# Patient Record
Sex: MALE | Race: WHITE | Employment: OTHER | URBAN - METROPOLITAN AREA
[De-identification: names, ages, dates, MRNs, and addresses within clinical notes are randomized per-mention and may not be internally consistent; named-entity substitution may affect disease eponyms.]

---

## 2017-04-18 PROBLEM — Q21.1 PFO (PATENT FORAMEN OVALE): Status: ACTIVE | Noted: 2017-04-18

## 2017-04-18 PROBLEM — Z83.3 FAMILY HISTORY OF DIABETES MELLITUS: Status: ACTIVE | Noted: 2017-04-18

## 2017-04-18 PROBLEM — Z86.73 H/O: STROKE: Status: ACTIVE | Noted: 2017-04-18

## 2017-04-18 PROBLEM — N40.1 BPH ASSOCIATED WITH NOCTURIA: Status: ACTIVE | Noted: 2017-04-18

## 2017-04-18 PROBLEM — I34.1 MITRAL VALVE PROLAPSE: Status: ACTIVE | Noted: 2017-04-18

## 2017-04-18 PROBLEM — J45.20 MILD INTERMITTENT ASTHMA WITHOUT COMPLICATION: Status: ACTIVE | Noted: 2017-04-18

## 2017-04-18 PROBLEM — M17.0 PRIMARY OSTEOARTHRITIS OF BOTH KNEES: Status: ACTIVE | Noted: 2017-04-18

## 2017-04-18 PROBLEM — R35.1 BPH ASSOCIATED WITH NOCTURIA: Status: ACTIVE | Noted: 2017-04-18

## 2017-04-18 PROBLEM — I10 ESSENTIAL HYPERTENSION: Status: ACTIVE | Noted: 2017-04-18

## 2018-09-14 ENCOUNTER — HOSPITAL ENCOUNTER (INPATIENT)
Facility: HOSPITAL | Age: 58
LOS: 4 days | Discharge: HOME | DRG: 392 | End: 2018-09-18
Attending: INTERNAL MEDICINE | Admitting: INTERNAL MEDICINE
Payer: COMMERCIAL

## 2018-09-14 DIAGNOSIS — K57.92 DIVERTICULITIS: Primary | ICD-10-CM

## 2018-09-14 RX ORDER — POLYETHYLENE GLYCOL 3350 17 G/17G
17 POWDER, FOR SOLUTION ORAL DAILY PRN
Status: DISCONTINUED | OUTPATIENT
Start: 2018-09-14 | End: 2018-09-18

## 2018-09-14 RX ORDER — ALBUTEROL SULFATE 90 UG/1
1-2 AEROSOL, METERED RESPIRATORY (INHALATION) EVERY 6 HOURS PRN
Status: DISCONTINUED | OUTPATIENT
Start: 2018-09-14 | End: 2018-09-18

## 2018-09-14 RX ORDER — BISACODYL 10 MG
10 SUPPOSITORY, RECTAL RECTAL
Status: DISCONTINUED | OUTPATIENT
Start: 2018-09-14 | End: 2018-09-14

## 2018-09-14 RX ORDER — MORPHINE SULFATE 4 MG/ML
1 INJECTION, SOLUTION INTRAMUSCULAR; INTRAVENOUS EVERY 2 HOUR PRN
Status: DISCONTINUED | OUTPATIENT
Start: 2018-09-14 | End: 2018-09-18

## 2018-09-14 RX ORDER — DOCUSATE SODIUM 100 MG/1
100 CAPSULE, LIQUID FILLED ORAL 2 TIMES DAILY
Status: DISCONTINUED | OUTPATIENT
Start: 2018-09-14 | End: 2018-09-14

## 2018-09-14 RX ORDER — ONDANSETRON 2 MG/ML
4 INJECTION INTRAMUSCULAR; INTRAVENOUS EVERY 6 HOURS PRN
Status: DISCONTINUED | OUTPATIENT
Start: 2018-09-14 | End: 2018-09-18

## 2018-09-14 RX ORDER — ATORVASTATIN CALCIUM 10 MG/1
10 TABLET, FILM COATED ORAL NIGHTLY
Status: DISCONTINUED | OUTPATIENT
Start: 2018-09-14 | End: 2018-09-18

## 2018-09-14 RX ORDER — MORPHINE SULFATE 4 MG/ML
4 INJECTION, SOLUTION INTRAMUSCULAR; INTRAVENOUS EVERY 2 HOUR PRN
Status: DISCONTINUED | OUTPATIENT
Start: 2018-09-14 | End: 2018-09-18

## 2018-09-14 RX ORDER — ACETAMINOPHEN 325 MG/1
650 TABLET ORAL EVERY 6 HOURS PRN
Status: DISCONTINUED | OUTPATIENT
Start: 2018-09-14 | End: 2018-09-18

## 2018-09-14 RX ORDER — MORPHINE SULFATE 4 MG/ML
2 INJECTION, SOLUTION INTRAMUSCULAR; INTRAVENOUS EVERY 2 HOUR PRN
Status: DISCONTINUED | OUTPATIENT
Start: 2018-09-14 | End: 2018-09-18

## 2018-09-14 RX ORDER — SODIUM CHLORIDE 9 MG/ML
INJECTION, SOLUTION INTRAVENOUS CONTINUOUS
Status: DISCONTINUED | OUTPATIENT
Start: 2018-09-14 | End: 2018-09-15

## 2018-09-14 RX ORDER — SODIUM PHOSPHATE, DIBASIC AND SODIUM PHOSPHATE, MONOBASIC 7; 19 G/133ML; G/133ML
1 ENEMA RECTAL ONCE AS NEEDED
Status: DISCONTINUED | OUTPATIENT
Start: 2018-09-14 | End: 2018-09-14

## 2018-09-14 RX ORDER — LOSARTAN POTASSIUM 50 MG/1
50 TABLET ORAL DAILY
Status: DISCONTINUED | OUTPATIENT
Start: 2018-09-14 | End: 2018-09-18

## 2018-09-14 RX ORDER — METOCLOPRAMIDE HYDROCHLORIDE 5 MG/ML
10 INJECTION INTRAMUSCULAR; INTRAVENOUS EVERY 8 HOURS PRN
Status: DISCONTINUED | OUTPATIENT
Start: 2018-09-14 | End: 2018-09-18

## 2018-09-15 ENCOUNTER — APPOINTMENT (OUTPATIENT)
Dept: CT IMAGING | Facility: HOSPITAL | Age: 58
DRG: 392 | End: 2018-09-15
Attending: INTERNAL MEDICINE
Payer: COMMERCIAL

## 2018-09-15 LAB
ANION GAP SERPL CALC-SCNC: 10 MMOL/L (ref 0–18)
BASOPHILS # BLD AUTO: 0.04 X10(3) UL (ref 0–0.1)
BASOPHILS NFR BLD AUTO: 0.4 %
BUN BLD-MCNC: 14 MG/DL (ref 8–20)
BUN/CREAT SERPL: 12.7 (ref 10–20)
CALCIUM BLD-MCNC: 8.7 MG/DL (ref 8.3–10.3)
CHLORIDE SERPL-SCNC: 104 MMOL/L (ref 101–111)
CO2 SERPL-SCNC: 24 MMOL/L (ref 22–32)
CREAT BLD-MCNC: 1.1 MG/DL (ref 0.7–1.3)
EOSINOPHIL # BLD AUTO: 0.19 X10(3) UL (ref 0–0.3)
EOSINOPHIL NFR BLD AUTO: 2.1 %
ERYTHROCYTE [DISTWIDTH] IN BLOOD BY AUTOMATED COUNT: 12.3 % (ref 11.5–16)
GLUCOSE BLD-MCNC: 112 MG/DL (ref 65–99)
GLUCOSE BLD-MCNC: 69 MG/DL (ref 70–99)
GLUCOSE BLD-MCNC: 76 MG/DL (ref 65–99)
GLUCOSE BLD-MCNC: 83 MG/DL (ref 65–99)
GLUCOSE BLD-MCNC: 89 MG/DL (ref 65–99)
GLUCOSE BLD-MCNC: 90 MG/DL (ref 65–99)
GLUCOSE BLD-MCNC: 99 MG/DL (ref 65–99)
HCT VFR BLD AUTO: 34.2 % (ref 37–53)
HGB BLD-MCNC: 11.4 G/DL (ref 13–17)
IMMATURE GRANULOCYTE COUNT: 0.03 X10(3) UL (ref 0–1)
IMMATURE GRANULOCYTE RATIO %: 0.3 %
LYMPHOCYTES # BLD AUTO: 1.42 X10(3) UL (ref 0.9–4)
LYMPHOCYTES NFR BLD AUTO: 15.4 %
MCH RBC QN AUTO: 31.2 PG (ref 27–33.2)
MCHC RBC AUTO-ENTMCNC: 33.3 G/DL (ref 31–37)
MCV RBC AUTO: 93.7 FL (ref 80–99)
MONOCYTES # BLD AUTO: 0.78 X10(3) UL (ref 0.1–1)
MONOCYTES NFR BLD AUTO: 8.4 %
NEUTROPHIL ABS PRELIM: 6.78 X10 (3) UL (ref 1.3–6.7)
NEUTROPHILS # BLD AUTO: 6.78 X10(3) UL (ref 1.3–6.7)
NEUTROPHILS NFR BLD AUTO: 73.4 %
OSMOLALITY SERPL CALC.SUM OF ELEC: 285 MOSM/KG (ref 275–295)
PLATELET # BLD AUTO: 261 10(3)UL (ref 150–450)
POTASSIUM SERPL-SCNC: 3.7 MMOL/L (ref 3.6–5.1)
RBC # BLD AUTO: 3.65 X10(6)UL (ref 4.3–5.7)
RED CELL DISTRIBUTION WIDTH-SD: 42.7 FL (ref 35.1–46.3)
SODIUM SERPL-SCNC: 138 MMOL/L (ref 136–144)
WBC # BLD AUTO: 9.2 X10(3) UL (ref 4–13)

## 2018-09-15 PROCEDURE — 87186 SC STD MICRODIL/AGAR DIL: CPT | Performed by: HOSPITALIST

## 2018-09-15 PROCEDURE — 0D9N30Z DRAINAGE OF SIGMOID COLON WITH DRAINAGE DEVICE, PERCUTANEOUS APPROACH: ICD-10-PCS | Performed by: RADIOLOGY

## 2018-09-15 PROCEDURE — 87070 CULTURE OTHR SPECIMN AEROBIC: CPT | Performed by: HOSPITALIST

## 2018-09-15 PROCEDURE — 87077 CULTURE AEROBIC IDENTIFY: CPT | Performed by: HOSPITALIST

## 2018-09-15 PROCEDURE — 99153 MOD SED SAME PHYS/QHP EA: CPT | Performed by: INTERNAL MEDICINE

## 2018-09-15 PROCEDURE — 82962 GLUCOSE BLOOD TEST: CPT

## 2018-09-15 PROCEDURE — 80048 BASIC METABOLIC PNL TOTAL CA: CPT | Performed by: INTERNAL MEDICINE

## 2018-09-15 PROCEDURE — 99152 MOD SED SAME PHYS/QHP 5/>YRS: CPT | Performed by: INTERNAL MEDICINE

## 2018-09-15 PROCEDURE — 87205 SMEAR GRAM STAIN: CPT | Performed by: HOSPITALIST

## 2018-09-15 PROCEDURE — 85025 COMPLETE CBC W/AUTO DIFF WBC: CPT | Performed by: INTERNAL MEDICINE

## 2018-09-15 PROCEDURE — 49406 IMAGE CATH FLUID PERI/RETRO: CPT | Performed by: INTERNAL MEDICINE

## 2018-09-15 RX ORDER — MIDAZOLAM HYDROCHLORIDE 1 MG/ML
INJECTION INTRAMUSCULAR; INTRAVENOUS
Status: COMPLETED
Start: 2018-09-15 | End: 2018-09-15

## 2018-09-15 RX ORDER — FLUMAZENIL 0.1 MG/ML
0.2 INJECTION, SOLUTION INTRAVENOUS AS NEEDED
Status: DISCONTINUED | OUTPATIENT
Start: 2018-09-15 | End: 2018-09-15 | Stop reason: HOSPADM

## 2018-09-15 RX ORDER — MIDAZOLAM HYDROCHLORIDE 1 MG/ML
1 INJECTION INTRAMUSCULAR; INTRAVENOUS EVERY 5 MIN PRN
Status: DISCONTINUED | OUTPATIENT
Start: 2018-09-15 | End: 2018-09-15 | Stop reason: HOSPADM

## 2018-09-15 RX ORDER — NALOXONE HYDROCHLORIDE 0.4 MG/ML
80 INJECTION, SOLUTION INTRAMUSCULAR; INTRAVENOUS; SUBCUTANEOUS AS NEEDED
Status: DISCONTINUED | OUTPATIENT
Start: 2018-09-15 | End: 2018-09-15 | Stop reason: HOSPADM

## 2018-09-15 RX ORDER — DEXTROSE AND SODIUM CHLORIDE 5; .9 G/100ML; G/100ML
INJECTION, SOLUTION INTRAVENOUS CONTINUOUS
Status: DISCONTINUED | OUTPATIENT
Start: 2018-09-15 | End: 2018-09-17

## 2018-09-15 RX ORDER — POTASSIUM CHLORIDE 14.9 MG/ML
20 INJECTION INTRAVENOUS ONCE
Status: COMPLETED | OUTPATIENT
Start: 2018-09-15 | End: 2018-09-15

## 2018-09-15 RX ORDER — SODIUM CHLORIDE 9 MG/ML
INJECTION, SOLUTION INTRAVENOUS CONTINUOUS
Status: DISCONTINUED | OUTPATIENT
Start: 2018-09-15 | End: 2018-09-15 | Stop reason: HOSPADM

## 2018-09-15 RX ORDER — DIPHENHYDRAMINE HYDROCHLORIDE 50 MG/ML
INJECTION INTRAMUSCULAR; INTRAVENOUS
Status: DISPENSED
Start: 2018-09-15 | End: 2018-09-16

## 2018-09-15 NOTE — CONSULTS
[facility]    Report of Gastroenterology Consultation    Anais Ray Patient Status:  Inpatient    1960 MRN [id number]   [facility] 3NW-A Attending Yoli Freeman MD   Hosp Day # 1 PCP [facility]     Date of Admission:   wnl  07/13/2011: UPPER GI ENDOSCOPY,DIAGNOSIS      Comment:  wnl, Gastric and SB Bx negative  Family History   Problem Relation Age of Onset   • Other (Other) Mother         colon polyp requiring surgery   • Other (Other) Brother         polyp requiring tenorio abdominal distention, jaundice, flatulence, vomiting blood, bloating, hernia, laxative use, food/milk intolerance, pain with bowel movement, hemorrhoids.   General: Denies fatigue, chills/fever, night sweats, weight loss, loss of appetite, weight gain, slee bad taste in mouth, hearing loss. Physical Exam:    Blood pressure 125/74, pulse 77, temperature 98.1 °F (36.7 °C), temperature source Oral, resp. rate 18, SpO2 100 %. General: Appears alert, oriented x3 and in no acute distress.   HEENT: Normal. No nec

## 2018-09-15 NOTE — PLAN OF CARE
Problem: PAIN - ADULT  Goal: Verbalizes/displays adequate comfort level or patient's stated pain goal  INTERVENTIONS:  - Encourage pt to monitor pain and request assistance  - Assess pain using appropriate pain scale  - Administer analgesics based on type understanding and agreeable to wearing SCD's. Denies CP, DANIELA, calf pain, nausea. POC discussed, verbalizes understanding. Will continue to monitor.

## 2018-09-15 NOTE — H&P
[facility] hospitalist H+P  Accepted by another doctor  I took over care this morning 9/14/18    PCP ANGELITA OCAMPO pain  HPI 61 yo male with multiple medical problems including but not limited to GERD, CVA, HL presented with abdominal pain, LLQ, had recent education: Not on file      Highest education level: Not on file    Social Needs      Financial resource strain: Not on file      Food insecurity - worry: Not on file      Food insecurity - inability: Not on file      Transportation needs - medical: Not on tender cervical LAD  CV: RRR, nl S1/S2  Pulm: CTA b/l  Abd; soft, not tender at this time, +BS  Ext: no edema  Skin warm, dry  Psych calm, cooperative    Labs  WBC 9.2 Hg 11.4 Plt 261    Na 138 K 3.7 Cl 104 Co2 24, BUN 14, Creatinine 1.1 Gluocse 69    CT a

## 2018-09-15 NOTE — PLAN OF CARE
Direct admission at 2000, care until 2330  A/ox4, vss on room air. Medicated with tylenol for rlq abd pain, denies nausea. Gen surgery paged, missed call back, endorsed to oncoming nurse.     PAIN - ADULT    • Verbalizes/displays adequate comfort level or p

## 2018-09-15 NOTE — IMAGING NOTE
Pt into room CT scan 1 for ct guided drain placement. Pt informed and consented per .  Pt lying on back with left side of abd cleansed and prepped. Pt had normal saline iv fluids to maintain patency. Pt tolerated procedure well.   Pt has brown foul

## 2018-09-15 NOTE — PROGRESS NOTES
Received pt from IR a&ox4, vss, afmaxibile. Drain with small amount of tan drainage. Insertion site with tegaderm to left hip CDI. C/o numbness to upper left leg thigh. Dr Melville Bosworth notified and aware. Inquiring about suppository.  Dr Luis Lorenzo notified, will hold off f

## 2018-09-15 NOTE — PROGRESS NOTES
Assumed care at 2300. No complaints overnight. IVF infusing. IV zosyn continued. Sleeping comfortably, all needs met.

## 2018-09-15 NOTE — CONSULTS
[facility]  Report of Consultation    Jone Raygoza Patient Status:  Inpatient    1960 MRN [id number]   [facility] 3NW-A Attending Genie Osler, MD   Hosp Day # 1 PCP 8 [facility]     Reason for Consultation:    Abdominal pain and SB Bx negative    Family History:    Family History   Problem Relation Age of Onset   • Other (Other) Mother         colon polyp requiring surgery   • Other (Other) Brother         polyp requiring surgery   • Other (Other) Father         AAA   • Cancer 90 tablet Rfl: 0   Aspirin 325 MG Oral Tab daily Disp:  Rfl:    Omega-3 Fatty Acids (FISH OIL) 1000 MG Oral Cap daily Disp:  Rfl:    Multiple Vitamin (MULTIVITAMINS) Oral Tab 1 TABLET DAILY Disp:  Rfl:    DHA-EPA-Coenzyme Q10-Vitamin E (COQ-10 & FISH OIL) colonoscopy, progressing left lower quadrant abdominal pain. Previous inguinal hernia repair. COMPARISON STUDY: None available.  TECHNIQUE: Routine post-contrast abdominopelvic CT was performed using 80 mL ISOVUE 370. 3D multiplanar reformatted (MPR) images findings are nonspecific. 4. Chronically atrophied right kidney. Compensatory hypertrophy of the left kidney. 5. Very small left pleural effusion. Please see above for details and additional information.        Problem List:    Patient Active Problem List:

## 2018-09-16 ENCOUNTER — APPOINTMENT (OUTPATIENT)
Dept: GENERAL RADIOLOGY | Facility: HOSPITAL | Age: 58
DRG: 392 | End: 2018-09-16
Attending: HOSPITALIST
Payer: COMMERCIAL

## 2018-09-16 LAB
ANION GAP SERPL CALC-SCNC: 9 MMOL/L (ref 0–18)
BASOPHILS # BLD AUTO: 0.05 X10(3) UL (ref 0–0.1)
BASOPHILS NFR BLD AUTO: 0.6 %
BUN BLD-MCNC: 9 MG/DL (ref 8–20)
BUN/CREAT SERPL: 8.2 (ref 10–20)
CALCIUM BLD-MCNC: 8.7 MG/DL (ref 8.3–10.3)
CHLORIDE SERPL-SCNC: 104 MMOL/L (ref 101–111)
CO2 SERPL-SCNC: 26 MMOL/L (ref 22–32)
CREAT BLD-MCNC: 1.1 MG/DL (ref 0.7–1.3)
EOSINOPHIL # BLD AUTO: 0.31 X10(3) UL (ref 0–0.3)
EOSINOPHIL NFR BLD AUTO: 3.8 %
ERYTHROCYTE [DISTWIDTH] IN BLOOD BY AUTOMATED COUNT: 12.3 % (ref 11.5–16)
GLUCOSE BLD-MCNC: 100 MG/DL (ref 65–99)
GLUCOSE BLD-MCNC: 105 MG/DL (ref 70–99)
GLUCOSE BLD-MCNC: 108 MG/DL (ref 65–99)
GLUCOSE BLD-MCNC: 110 MG/DL (ref 65–99)
GLUCOSE BLD-MCNC: 154 MG/DL (ref 65–99)
HCT VFR BLD AUTO: 35 % (ref 37–53)
HGB BLD-MCNC: 11.7 G/DL (ref 13–17)
IMMATURE GRANULOCYTE COUNT: 0.02 X10(3) UL (ref 0–1)
IMMATURE GRANULOCYTE RATIO %: 0.2 %
LACTIC ACID: 1.7 MMOL/L (ref 0.5–2)
LYMPHOCYTES # BLD AUTO: 1.54 X10(3) UL (ref 0.9–4)
LYMPHOCYTES NFR BLD AUTO: 19 %
MCH RBC QN AUTO: 31.6 PG (ref 27–33.2)
MCHC RBC AUTO-ENTMCNC: 33.4 G/DL (ref 31–37)
MCV RBC AUTO: 94.6 FL (ref 80–99)
MONOCYTES # BLD AUTO: 0.74 X10(3) UL (ref 0.1–1)
MONOCYTES NFR BLD AUTO: 9.1 %
NEUTROPHIL ABS PRELIM: 5.43 X10 (3) UL (ref 1.3–6.7)
NEUTROPHILS # BLD AUTO: 5.43 X10(3) UL (ref 1.3–6.7)
NEUTROPHILS NFR BLD AUTO: 67.3 %
OSMOLALITY SERPL CALC.SUM OF ELEC: 287 MOSM/KG (ref 275–295)
PLATELET # BLD AUTO: 284 10(3)UL (ref 150–450)
POTASSIUM SERPL-SCNC: 3.8 MMOL/L (ref 3.6–5.1)
POTASSIUM SERPL-SCNC: 3.8 MMOL/L (ref 3.6–5.1)
PROCALCITONIN SERPL-MCNC: 0.11 NG/ML
RBC # BLD AUTO: 3.7 X10(6)UL (ref 4.3–5.7)
RED CELL DISTRIBUTION WIDTH-SD: 42.8 FL (ref 35.1–46.3)
SODIUM SERPL-SCNC: 139 MMOL/L (ref 136–144)
URATE SERPL-MCNC: 5.2 MG/DL (ref 2.4–8.7)
WBC # BLD AUTO: 8.1 X10(3) UL (ref 4–13)

## 2018-09-16 PROCEDURE — 84145 PROCALCITONIN (PCT): CPT | Performed by: HOSPITALIST

## 2018-09-16 PROCEDURE — 73130 X-RAY EXAM OF HAND: CPT | Performed by: HOSPITALIST

## 2018-09-16 PROCEDURE — 80048 BASIC METABOLIC PNL TOTAL CA: CPT | Performed by: HOSPITALIST

## 2018-09-16 PROCEDURE — 83605 ASSAY OF LACTIC ACID: CPT | Performed by: HOSPITALIST

## 2018-09-16 PROCEDURE — 85025 COMPLETE CBC W/AUTO DIFF WBC: CPT | Performed by: HOSPITALIST

## 2018-09-16 PROCEDURE — 82962 GLUCOSE BLOOD TEST: CPT

## 2018-09-16 PROCEDURE — 84132 ASSAY OF SERUM POTASSIUM: CPT | Performed by: HOSPITALIST

## 2018-09-16 PROCEDURE — 84550 ASSAY OF BLOOD/URIC ACID: CPT | Performed by: HOSPITALIST

## 2018-09-16 RX ORDER — METRONIDAZOLE 500 MG/100ML
500 INJECTION, SOLUTION INTRAVENOUS EVERY 8 HOURS
Status: DISCONTINUED | OUTPATIENT
Start: 2018-09-16 | End: 2018-09-18

## 2018-09-16 RX ORDER — DIPHENHYDRAMINE HYDROCHLORIDE 12.5 MG/5ML
12.5 SOLUTION ORAL 4 TIMES DAILY PRN
Status: DISCONTINUED | OUTPATIENT
Start: 2018-09-16 | End: 2018-09-18

## 2018-09-16 RX ORDER — ASPIRIN 325 MG
325 TABLET ORAL
Status: DISCONTINUED | OUTPATIENT
Start: 2018-09-16 | End: 2018-09-18

## 2018-09-16 RX ORDER — POTASSIUM CHLORIDE 20 MEQ/1
40 TABLET, EXTENDED RELEASE ORAL ONCE
Status: COMPLETED | OUTPATIENT
Start: 2018-09-16 | End: 2018-09-16

## 2018-09-16 RX ORDER — LEVOFLOXACIN 5 MG/ML
750 INJECTION, SOLUTION INTRAVENOUS EVERY 24 HOURS
Status: DISCONTINUED | OUTPATIENT
Start: 2018-09-16 | End: 2018-09-18

## 2018-09-16 RX ORDER — HEPARIN SODIUM 5000 [USP'U]/ML
5000 INJECTION, SOLUTION INTRAVENOUS; SUBCUTANEOUS EVERY 8 HOURS SCHEDULED
Status: DISCONTINUED | OUTPATIENT
Start: 2018-09-16 | End: 2018-09-18

## 2018-09-16 NOTE — PROGRESS NOTES
[facility] hospitalist daily note  Seen/examined on 9/16/18    S' patinet developed rash on abdomen/chest/back.  Itchy  abd pain better, has pain at IR tube site  No chest pain, no SOB  Patient developed left thumb pain/swelling/redness    Medications in Epic    P

## 2018-09-16 NOTE — PROGRESS NOTES
[facility]  Progress Note    Benita Romero Patient Status:  Inpatient    1960 MRN [id number]   [facility] 3NW-A Attending Latricia Zhu MD   Hosp Day # 2 PCP [facility]     Subjective:    Feels better post IR drainage.   No chelsey

## 2018-09-16 NOTE — CONSULTS
[facility] Dosing Service  Antibiotic Dosing    Jay Gonzalez is a 62year old male for whom pharmacy is dosing Levaquin and Flagyl for treatment of  diverticulitis.  .  Other antibiotics (Not dosed by pharmacy): none    Allergies: is allergic to zosyn [p

## 2018-09-17 LAB
GLUCOSE BLD-MCNC: 106 MG/DL (ref 65–99)
GLUCOSE BLD-MCNC: 110 MG/DL (ref 65–99)
GLUCOSE BLD-MCNC: 121 MG/DL (ref 65–99)
GLUCOSE BLD-MCNC: 121 MG/DL (ref 65–99)
POTASSIUM SERPL-SCNC: 3.8 MMOL/L (ref 3.6–5.1)

## 2018-09-17 PROCEDURE — 82962 GLUCOSE BLOOD TEST: CPT

## 2018-09-17 PROCEDURE — 84132 ASSAY OF SERUM POTASSIUM: CPT | Performed by: HOSPITALIST

## 2018-09-17 RX ORDER — POTASSIUM CHLORIDE 20 MEQ/1
40 TABLET, EXTENDED RELEASE ORAL ONCE
Status: COMPLETED | OUTPATIENT
Start: 2018-09-17 | End: 2018-09-17

## 2018-09-17 NOTE — PROGRESS NOTES
[facility] hospitalist daily note  Seen/examined on 9/17/18    S: Pt denies f/c/abd pain. Laying in bed, about to go for walk. Advancing to full liquids. No n/v.  Some diarrhea b/c of liquids    Medications in Epic    PE    09/17/18  1257   BP: 134/78   Pulse: 75

## 2018-09-17 NOTE — PROGRESS NOTES
Upon assessment, A&Ox4. Denies pain. Denies nausea. Abdomen soft, non distended. Bowel sounds active, present. Denies flatus. States last BM was yesterday & was loose. States voiding without difficulty.  Percutaneous drain to left side of abdomen draining s

## 2018-09-17 NOTE — CM/SW NOTE
Pt and his wife with concerns as to his insurance being aware of admission, that a pre-cert is required and if his stay here will be covered.      I advised them, he needs to notify [facility] he has been admitted to [facility] and explained the UR department w

## 2018-09-17 NOTE — PROGRESS NOTES
Name:John Marin Husbands  [facility]#:[id number]  IWY:[birth year]      Subjective:  Improving pain at drain site    Objective:  LLQ drain site is clean, dry and intact.       Vital Signs:  Blood pressure 134/78, pulse 75, temperature 98.1 °F (36.7 °C), temperature source Or

## 2018-09-17 NOTE — PROGRESS NOTES
[facility]  Progress Note    Jone Raygoza Patient Status:  Inpatient    1960 MRN [id number]   [facility] 3NW-A Attending Genie Osler, MD   Hosp Day # 3 PCP [facility]     Subjective:    Patient has tolerated clear liquids, p

## 2018-09-17 NOTE — PROGRESS NOTES
PATIENT HAS IV FLUIDS INFUSING, ON A CLEAR LIQUID DIET, ON ROOM AIR, VOIDING WELL, HAVING BM'S, AMBULATES INDEPENDENTLY, IV LEVAQUIN AND FLAGYL SCHEDULED-PATIENT HAD ALLERGIC REACTION TO ZOSYN AND CURRENTLY HAS RASH COVERING BODY, IR DRAIN TO LLQ-FLUSHED Q

## 2018-09-17 NOTE — PLAN OF CARE
PAIN - ADULT    • Verbalizes/displays adequate comfort level or patient's stated pain goal Adequate for Discharge          SAFETY ADULT - FALL    • Free from fall injury Completed          DISCHARGE PLANNING    • Discharge to home or other facility with ap

## 2018-09-17 NOTE — PAYOR COMM NOTE
--------------  ADMISSION REVIEW     Payor: Briana DUENAS/RADHA  Subscriber #:  [id number]  Authorization Number: [id number]    Admit date: 9/14/18  Admit time: 6922       Admitting Physician: Kyrie Carbajal MD  Attending Physician:  MD HIEN Andrade following testicular trauma  10/4/07: UPPER GI ENDOSCOPY,DIAGNOSIS      Comment:  wnl  07/13/2011: UPPER GI ENDOSCOPY,DIAGNOSIS      Comment:  wnl, Gastric and SB Bx negative  Family History   Problem Relation Age of Onset   • Other (Other) Mother PERICOLONIC ABSCESS the sigmoid level, measuring approximately 8 x 3 x 3.5 cm, with surrounding  fat stranding and fascial thickening. 2. Colonic diverticulosis. 3. Mildly enlarged and heterogeneous prostate gland; these findings are nonspecific.   4. Chr Lower Abdomen) Margoth Wadsworth RN    9/16/2018 1444 Given 5000 Units Subcutaneous (Right Lower Abdomen) Rocael Minor RN      LevoFLOXacin in D5W [facility]) IVPB premix 750 mg     Date Action Dose Route User    9/16/2018 1556 New Bag 750 mg Int

## 2018-09-17 NOTE — CONSULTS
INFECTIOUS DISEASE CONSULTATION    Mark Linder Patient Status:  Inpatient    1960 MRN [id number]   [facility] 3NW-A Attending Laureano Dorantes MD   Hosp Day # 3 PCP Kushal Ledezma Relation Age of Onset   • Other (Other) Mother         colon polyp requiring surgery   • Other (Other) Brother         polyp requiring surgery   • Other (Other) Father         AAA   • Cancer Other         colon cancer      reports that  has never smoked.  h 3.  Vital signs: Temp:  [98.1 °F (36.7 °C)-98.5 °F (36.9 °C)] 98.1 °F (36.7 °C)  Pulse:  [79-81] 81  Resp:  [16-18] 18  BP: (103-108)/(57-66) 108/66  HEENT: Moist mucous membranes. Extraocular muscles are intact. Neck: No lymphadenopathy. supple, no masses reviewed:  Patient Active Problem List:     Mild intermittent asthma without complication     Hx stroke 2009; recovered     Mitral valve prolapse     Essential hypertension     Family history of diabetes mellitus     BPH associated with nocturia     Primar

## 2018-09-18 VITALS
TEMPERATURE: 98 F | DIASTOLIC BLOOD PRESSURE: 79 MMHG | RESPIRATION RATE: 18 BRPM | HEART RATE: 79 BPM | BODY MASS INDEX: 21 KG/M2 | WEIGHT: 175.5 LBS | OXYGEN SATURATION: 99 % | SYSTOLIC BLOOD PRESSURE: 128 MMHG

## 2018-09-18 LAB
ANION GAP SERPL CALC-SCNC: 7 MMOL/L (ref 0–18)
BUN BLD-MCNC: 5 MG/DL (ref 8–20)
BUN/CREAT SERPL: 4.7 (ref 10–20)
CALCIUM BLD-MCNC: 8.7 MG/DL (ref 8.3–10.3)
CHLORIDE SERPL-SCNC: 106 MMOL/L (ref 101–111)
CO2 SERPL-SCNC: 27 MMOL/L (ref 22–32)
CREAT BLD-MCNC: 1.06 MG/DL (ref 0.7–1.3)
ERYTHROCYTE [DISTWIDTH] IN BLOOD BY AUTOMATED COUNT: 12.2 % (ref 11.5–16)
GLUCOSE BLD-MCNC: 92 MG/DL (ref 70–99)
HAV IGM SER QL: 2 MG/DL (ref 1.8–2.5)
HCT VFR BLD AUTO: 34.3 % (ref 37–53)
HGB BLD-MCNC: 11.4 G/DL (ref 13–17)
MCH RBC QN AUTO: 31.1 PG (ref 27–33.2)
MCHC RBC AUTO-ENTMCNC: 33.2 G/DL (ref 31–37)
MCV RBC AUTO: 93.7 FL (ref 80–99)
OSMOLALITY SERPL CALC.SUM OF ELEC: 287 MOSM/KG (ref 275–295)
PLATELET # BLD AUTO: 327 10(3)UL (ref 150–450)
POTASSIUM SERPL-SCNC: 3.8 MMOL/L (ref 3.6–5.1)
POTASSIUM SERPL-SCNC: 3.8 MMOL/L (ref 3.6–5.1)
RBC # BLD AUTO: 3.66 X10(6)UL (ref 4.3–5.7)
RED CELL DISTRIBUTION WIDTH-SD: 42.5 FL (ref 35.1–46.3)
SODIUM SERPL-SCNC: 140 MMOL/L (ref 136–144)
WBC # BLD AUTO: 6.4 X10(3) UL (ref 4–13)

## 2018-09-18 PROCEDURE — 80048 BASIC METABOLIC PNL TOTAL CA: CPT | Performed by: INTERNAL MEDICINE

## 2018-09-18 PROCEDURE — 84132 ASSAY OF SERUM POTASSIUM: CPT | Performed by: HOSPITALIST

## 2018-09-18 PROCEDURE — 85027 COMPLETE CBC AUTOMATED: CPT | Performed by: INTERNAL MEDICINE

## 2018-09-18 PROCEDURE — 83735 ASSAY OF MAGNESIUM: CPT | Performed by: INTERNAL MEDICINE

## 2018-09-18 RX ORDER — POTASSIUM CHLORIDE 20 MEQ/1
40 TABLET, EXTENDED RELEASE ORAL ONCE
Status: COMPLETED | OUTPATIENT
Start: 2018-09-18 | End: 2018-09-18

## 2018-09-18 RX ORDER — METRONIDAZOLE 500 MG/1
500 TABLET ORAL 3 TIMES DAILY
Qty: 30 TABLET | Refills: 0 | Status: SHIPPED | OUTPATIENT
Start: 2018-09-18 | End: 2018-09-26

## 2018-09-18 RX ORDER — LEVOFLOXACIN 750 MG/1
750 TABLET ORAL DAILY
Qty: 14 TABLET | Refills: 0 | Status: SHIPPED | OUTPATIENT
Start: 2018-09-18 | End: 2018-10-02

## 2018-09-18 NOTE — PROGRESS NOTES
Called pt  Advised of result note comments  Pt verbalized understanding  See TE 9/18 with pt question

## 2018-09-18 NOTE — PAYOR COMM NOTE
--------------  CONTINUED STAY REVIEW    Payor: Noe DUENAS/RADHA  Subscriber #:  [id number]  Authorization Number: [id number]    Admit date: 9/14/18  Admit time: 0502    Admitting Physician: Abby King MD  Attending Physician:  Alden Mendez output.   CPM  Deneise Cockayne, MD   9/16/2018  2:59 PM    9/17/18:  INFECTIOUS DISEASE CONSULTATION  Temp:  [98.1 °F (36.7 °C)-98.5 °F (36.9 °C)] 98.1 °F (36.7 °C)  Pulse:  [79-81] 81  Resp:  [16-18] 18  BP: (103108)/(5766) 108/66    Specimen: Other from WBC  8.1  6.4   PLT  284.0  327. 0                     Recent Labs   Lab  09/14/18   1332    09/15/18   0622  09/16/18   0542  09/17/18   0504  09/18/18   0539   GLU  97   --   69*  105*   --   92   BUN  12   --   14  9   --   5*   CREATSERUM  1.10   -- Units Subcutaneous (Right Lower Abdomen) Ernestina Walsh RN    9/17/2018 2205 Given 5000 Units Subcutaneous (Right Upper Abdomen) rEnestina Walsh RN    9/17/2018 1315 Given 5000 Units Subcutaneous (Left Lower Abdomen) Juan José Juarez RN      LevoFLOXacin

## 2018-09-18 NOTE — CM/SW NOTE
Pt still has many questions about his insurance and if his hospital stay will be covered. I requested UR SHYLA Kaur speak with him directly.      Pt requested to speak with me again regarding scheduling and coverage for his outpatient CT that is ordered

## 2018-09-18 NOTE — PLAN OF CARE
Discharge to home or other facility with appropriate resources Progressing      Verbalizes/displays adequate comfort level or patient's stated pain goal Progressing      Patient/Family Long Term Goal Progressing      Patient/Family Short Term Goal Progress

## 2018-09-18 NOTE — IMAGING NOTE
Ready for d/c per sx. Agree w/ f/u outpt CT abscessogram as guided by outputs, prob early next week.

## 2018-09-18 NOTE — PROGRESS NOTES
[facility]  Progress Note    Idalia Decree Patient Status:  Inpatient    1960 MRN [id number]   [facility] 3NW-A Attending Halina Murray MD   Hosp Day # 4 PCP [facility]     Subjective:    No new complaints today  Tolerat

## 2018-09-18 NOTE — PROGRESS NOTES
Patient and patient's wife instructed at this time how to empty IR drain and how to flush IR drain. Patient's wife was able to return demonstration of emptying drain, flushing drain and measuring output. All questions and concerns were addressed.

## 2018-09-18 NOTE — DISCHARGE SUMMARY
[facility] Internal Medicine Discharge Summary   Patient ID:  Barney Boggs  [id number]  10 year old  5/2/1960    Admit date: 9/14/2018    Discharge date and time: 9/18/2018     Attending Physician: Amada Wheatley MD     Primary Care Physician: [facility] Resp: 18   Temp: 97.9 °F (36.6 °C)       Exam on day of discharge:  Pt walking, no abd pain, n/v. On soft.  +loose stool    Gen: No acute distress, alert and oriented  CV: RRR, +s1/s2  Lungs: CTAB, good respiratory effort  Abdomen: s/nt/nd, +drain/PHOEBE  Ext swelling, erythema left thumb  PATIENT STATED HISTORY: (As transcribed by Technologist)  Patient shares he had colon surgery a few days ago and does not recall hurting his left thumb or left hand.  Patient has pain in the anterior aspect of the 1st metacarp limited due to lack of oral contrast opacification in these distributions and the presence of a moderate amount of stool. Colonic diverticulosis is noted.  At the sigmoid level, a peripherally enhancing fluid density mass is noted lung the antimesenteric tenorio site was chosen. The overlying skin was prepped in the usual sterile manner. 1% lidocaine was used locally. Using CT control an 18 gauge Worthy needle was incrementally advanced.   Once deep to the peritoneal cavity, the sharp stylet was replaced with t MD Weston Thompson  [phone]  9/18/2018  1:22 PM

## 2018-09-18 NOTE — PROGRESS NOTES
[facility]                INFECTIOUS DISEASE PROGRESS NOTE    Lisa Carr Patient Status:  Inpatient    1960 MRN [id number]   [facility] 3NW-A Attending Matthias Maxwell MD   Hosp Day # 4 PCP [facility]     Antibioti BILT  0.79   --    --    --    --    --    TP  7.9   --    --    --    --    --     < > = values in this interval not displayed.        No results found for: [facility]  Microbiology  Aerobic Bacterial Culture Once [[id number]] (Abnormal)  Collected: 09/15/18 130

## 2018-09-19 NOTE — PAYOR COMM NOTE
--------------  DISCHARGE REVIEW    Payor: Cam[place] ROBERTO  Subscriber #:  [id number]  Authorization Number: [id number]    Admit date: 9/14/18  Admit time:  1957  Discharge Date: 9/18/2018  2:51 PM     Admitting Physician: Rebeka Stokes MD  Attendin following  -pain medication ordered  -culture c e coli/strep, pan sens  Will need CT abscessogram - discussed Friday or Monday  F/u with Dr Leti Pang in 1 week    Hypoglycemia; resolved, was likely due to NPO status, added d5 to IVF  GERD; no complaint at this total) by mouth every evening.            Where to Get Your Medications      You can get these medications from any pharmacy    Bring a paper prescription for each of these medications  · levofloxacin 750 MG Tabs  · metRONIDAZOLE 500 MG Tabs       Consults: The overlying skin was prepped in the usual sterile manner. 1% lidocaine was used locally. Using CT control an 18 gauge Worthy needle was incrementally advanced. Once deep to the peritoneal cavity, the sharp stylet was replaced with the blunt stylet.

## 2018-09-20 ENCOUNTER — HOSPITAL ENCOUNTER (OUTPATIENT)
Dept: CT IMAGING | Facility: HOSPITAL | Age: 58
Discharge: HOME | End: 2018-09-20
Attending: PHYSICIAN ASSISTANT
Payer: COMMERCIAL

## 2018-09-20 DIAGNOSIS — K57.92 DIVERTICULITIS: ICD-10-CM

## 2018-09-20 PROCEDURE — 76080 X-RAY EXAM OF FISTULA: CPT | Performed by: PHYSICIAN ASSISTANT

## 2018-09-20 PROCEDURE — 49424 ASSESS CYST CONTRAST INJECT: CPT | Performed by: PHYSICIAN ASSISTANT

## 2018-10-18 ENCOUNTER — HOSPITAL ENCOUNTER (EMERGENCY)
Facility: HOSPITAL | Age: 58
Discharge: HOME | End: 2018-10-18
Attending: EMERGENCY MEDICINE
Payer: COMMERCIAL

## 2018-10-18 ENCOUNTER — APPOINTMENT (OUTPATIENT)
Dept: CT IMAGING | Facility: HOSPITAL | Age: 58
End: 2018-10-18
Attending: EMERGENCY MEDICINE
Payer: COMMERCIAL

## 2018-10-18 VITALS
SYSTOLIC BLOOD PRESSURE: 127 MMHG | BODY MASS INDEX: 21 KG/M2 | WEIGHT: 170 LBS | TEMPERATURE: 98 F | OXYGEN SATURATION: 96 % | HEART RATE: 74 BPM | RESPIRATION RATE: 20 BRPM | DIASTOLIC BLOOD PRESSURE: 78 MMHG

## 2018-10-18 DIAGNOSIS — R10.32 ABDOMINAL PAIN, LEFT LOWER QUADRANT: Primary | ICD-10-CM

## 2018-10-18 PROCEDURE — 85652 RBC SED RATE AUTOMATED: CPT | Performed by: EMERGENCY MEDICINE

## 2018-10-18 PROCEDURE — 83690 ASSAY OF LIPASE: CPT | Performed by: EMERGENCY MEDICINE

## 2018-10-18 PROCEDURE — 80053 COMPREHEN METABOLIC PANEL: CPT | Performed by: EMERGENCY MEDICINE

## 2018-10-18 PROCEDURE — 99285 EMERGENCY DEPT VISIT HI MDM: CPT

## 2018-10-18 PROCEDURE — 85025 COMPLETE CBC W/AUTO DIFF WBC: CPT | Performed by: EMERGENCY MEDICINE

## 2018-10-18 PROCEDURE — 96360 HYDRATION IV INFUSION INIT: CPT

## 2018-10-18 PROCEDURE — 74177 CT ABD & PELVIS W/CONTRAST: CPT | Performed by: EMERGENCY MEDICINE

## 2018-10-18 PROCEDURE — 81003 URINALYSIS AUTO W/O SCOPE: CPT | Performed by: EMERGENCY MEDICINE

## 2018-10-18 NOTE — ED PROVIDER NOTES
Patient Seen in: [facility] Emergency Department    History   Patient presents with:  Abscess (integumentary)  Abdomen/Flank Pain (GI/)    Stated Complaint: Sent here by PCP with concern for recurrent abdominal abscess    HPI    66-year-old male c CT drainage of diverticular abscess   • UPPER GI ENDOSCOPY,DIAGNOSIS  10/4/07    wnl   • UPPER GI ENDOSCOPY,DIAGNOSIS  07/13/2011    wnl, Gastric and SB Bx negative           Social History    Tobacco Use      Smoking status: Never Smoker      Smokeless to (*)     All other components within normal limits   LIPASE - Normal   CBC WITH DIFFERENTIAL WITH PLATELET    Narrative: The following orders were created for panel order CBC WITH DIFFERENTIAL WITH PLATELET.   Procedure                               Abno this CT administered without oral contrast.  No thick-walled enhancing structure indicative of abscess. No signs of ascites, bowel obstruction or free air. Solid organs of the abdomen shows no acute process.   Very small chronically atrophied  right kidne consistent with history of drain flushes. Numerous diverticula are seen in the visualized  sigmoid colon. Radiopaque mesh identified in the anterior abdominal wall bilaterally consistent with previous hernia surgery repair.   Visualized urinary bladder ap

## 2018-12-10 ENCOUNTER — HOSPITAL ENCOUNTER (OUTPATIENT)
Dept: CV DIAGNOSTICS | Facility: HOSPITAL | Age: 58
Discharge: HOME | End: 2018-12-10
Attending: FAMILY MEDICINE
Payer: COMMERCIAL

## 2018-12-10 DIAGNOSIS — I25.10 CORONARY SCLEROSIS: ICD-10-CM

## 2018-12-10 PROCEDURE — 93350 STRESS TTE ONLY: CPT | Performed by: FAMILY MEDICINE

## 2018-12-10 PROCEDURE — 93017 CV STRESS TEST TRACING ONLY: CPT | Performed by: FAMILY MEDICINE

## 2018-12-10 PROCEDURE — 93018 CV STRESS TEST I&R ONLY: CPT | Performed by: FAMILY MEDICINE

## 2020-09-14 PROBLEM — M16.0 PRIMARY OSTEOARTHRITIS OF BOTH HIPS: Status: ACTIVE | Noted: 2020-09-14

## 2020-09-14 PROBLEM — E78.5 DYSLIPIDEMIA: Status: ACTIVE | Noted: 2020-09-14

## (undated) NOTE — ED AVS SNAPSHOT
Mr. Tidwell Malcom   MRN: [id number]    Department:  [facility] Emergency Department   Date of Visit:  10/18/2018           Disclosure     Insurance plans vary and the physician(s) referred by the ER may not be covered by your plan.  Please contact tell this physician (or your personal doctor if your instructions are to return to your personal doctor) about any new or lasting problems. The primary care or specialist physician will see patients referred from the [facility] Emergency Department.  Reena Rodriguez